# Patient Record
Sex: MALE | Race: WHITE | NOT HISPANIC OR LATINO | URBAN - METROPOLITAN AREA
[De-identification: names, ages, dates, MRNs, and addresses within clinical notes are randomized per-mention and may not be internally consistent; named-entity substitution may affect disease eponyms.]

---

## 2021-01-22 ENCOUNTER — CARE COORDINATION (OUTPATIENT)
Dept: CARDIOLOGY | Facility: CLINIC | Age: 41
End: 2021-01-22

## 2021-01-22 PROBLEM — I25.10 CORONARY ARTERY DISEASE INVOLVING NATIVE HEART WITHOUT ANGINA PECTORIS: Status: ACTIVE | Noted: 2020-09-01

## 2021-01-22 PROBLEM — I50.22 CHRONIC SYSTOLIC CHF (CONGESTIVE HEART FAILURE) (H): Status: ACTIVE | Noted: 2020-09-01

## 2021-01-22 PROBLEM — I25.5 ISCHEMIC CARDIOMYOPATHY: Status: ACTIVE | Noted: 2020-09-01

## 2021-01-22 PROBLEM — I21.3 STEMI (ST ELEVATION MYOCARDIAL INFARCTION) (H): Status: ACTIVE | Noted: 2020-08-14

## 2021-01-22 PROBLEM — E78.2 MIXED HYPERLIPIDEMIA: Status: ACTIVE | Noted: 2020-09-01

## 2021-01-22 RX ORDER — EZETIMIBE 10 MG/1
10 TABLET ORAL DAILY
COMMUNITY
Start: 2020-11-16 | End: 2021-06-01

## 2021-01-22 RX ORDER — METOPROLOL SUCCINATE 100 MG/1
50 TABLET, EXTENDED RELEASE ORAL DAILY
COMMUNITY
Start: 2020-11-10 | End: 2021-06-01

## 2021-01-22 RX ORDER — FUROSEMIDE 20 MG
20 TABLET ORAL PRN
COMMUNITY
Start: 2020-12-09 | End: 2021-06-01

## 2021-01-22 RX ORDER — NITROGLYCERIN 0.4 MG/1
0.4 TABLET SUBLINGUAL EVERY 5 MIN PRN
COMMUNITY

## 2021-01-22 RX ORDER — PAROXETINE 30 MG/1
30 TABLET, FILM COATED ORAL DAILY
COMMUNITY
Start: 2020-10-28 | End: 2021-06-01

## 2021-01-22 RX ORDER — CLONAZEPAM 0.5 MG/1
TABLET ORAL
COMMUNITY
Start: 2020-12-11 | End: 2021-06-01

## 2021-01-22 RX ORDER — SPIRONOLACTONE 25 MG/1
12.5 TABLET ORAL DAILY
COMMUNITY
Start: 2020-08-16 | End: 2021-06-01

## 2021-01-22 RX ORDER — CLOPIDOGREL BISULFATE 75 MG/1
75 TABLET ORAL DAILY
COMMUNITY
Start: 2020-08-17 | End: 2021-06-01

## 2021-01-22 RX ORDER — ATORVASTATIN CALCIUM 80 MG/1
80 TABLET, FILM COATED ORAL AT BEDTIME
COMMUNITY
Start: 2020-10-28 | End: 2021-06-01

## 2021-01-22 NOTE — PROGRESS NOTES
Referral- [facility]    PLAN:       Sending to [facility] Nurse to Triage    Waiting to find out if it will be in-person or virtual.     Imaging: If in person will view in clinic, if doing virtual will have it pushed.       ATTEMPTS TO CONTACT:  1. January 22, 2021 - None  2. January 27, 2021 - Made appt in [place] 2/10 with Dr. Abe Goodman, [facility]  Heart Failure, Advanced Heart Failure & CORE  Referral Specialist &     [facility]  Cardiology  Office: [phone]  [phone]

## 2021-02-10 ENCOUNTER — VIRTUAL VISIT (OUTPATIENT)
Dept: CARDIOLOGY | Facility: CLINIC | Age: 41
End: 2021-02-10
Attending: INTERNAL MEDICINE
Payer: COMMERCIAL

## 2021-02-10 DIAGNOSIS — I50.20 HEART FAILURE WITH REDUCED EJECTION FRACTION, NYHA CLASS III (H): ICD-10-CM

## 2021-02-10 DIAGNOSIS — I25.5 ISCHEMIC CARDIOMYOPATHY: ICD-10-CM

## 2021-02-10 DIAGNOSIS — I10 BENIGN ESSENTIAL HYPERTENSION: Primary | ICD-10-CM

## 2021-02-10 DIAGNOSIS — I25.10 CORONARY ARTERY DISEASE INVOLVING NATIVE CORONARY ARTERY OF NATIVE HEART WITHOUT ANGINA PECTORIS: ICD-10-CM

## 2021-02-10 DIAGNOSIS — E78.2 MIXED HYPERLIPIDEMIA: ICD-10-CM

## 2021-02-10 PROCEDURE — 999N000103 HC STATISTIC NO CHARGE FACILITY FEE: Mod: GT

## 2021-02-10 PROCEDURE — 99204 OFFICE O/P NEW MOD 45 MIN: CPT | Mod: 95 | Performed by: INTERNAL MEDICINE

## 2021-02-10 NOTE — PROGRESS NOTES
Richmond is a 40 year old who is being evaluated via a billable video visit.      How would you like to obtain your AVS? MyChart  If the video visit is dropped, the invitation should be resent by: cell  Will anyone else be joining your video visit? No    Video Start Time: 10:15am    Video-Visit Details    Type of service:  Video Visit    Video End Time:10:57 AM    Originating Location (pt. Location): Home    Distant Location (provider location):  [facility] HEART [facility] [place]     Platform used for Video Visit: [employer]    February 10, 2021     Dear Dr. Douglas,  I had the pleasure of seeing Richmond Morales  in the [facility] Advanced Heart Failure Clinic. Richmond is a 40yr old male with a past medical history of systolic heart failure due to ischemic cardiomyopathy, CAD s/p STEMI with PCI and HLD. Patient was admitted on 8/14/2020 with chest pain and found to have STEMI at outside hospital. Given distance, he was treated with TNK and transferred to [facility] in [place]. Coronary angiogram was urgently performed which revealed 2V CAD s/p JOHN of mLAD with residual  of RCA. Echocardiogram showed LVEF of 20-25% at the time. He was started on HF and CAD medications and returned for planned coronary angiogram on 8/25/2020 when he underwent JOHN of  proximal RCA. Repeat echo showed LVEF remained 20-25%. Was seen 4 weeks after the intervention and had complaints of chest pain so PET scan was ordered. Ultimately went on to have PCI of proximal RPL for his symptoms and repeat TTE was performed that showed a perrsistently reduced EF at 25%.     I am talking to 19 over the video today to establish another [facility] advanced heart failure clinic.  He notes that he unfortunately was not able to make him present to the appointment today as he needs to go to a land auction this afternoon.  Notes that he has been doing relatively well with no new complaints.  He remains with class II symptoms I would say at this time.   He is able to do some form activities however shoveling some grain and doing more extensive physical work remains hard for him and he gets short of breath.  His weight has been quite variable initially while he was going to cardiac rehab it was in the 264 pounds range however recently small in the 274 pound range but pretty steady.  He does have a few pounds up and down and he started to take the Lasix every day rather than just taking it on as-needed basis as he found that he takes it regularly.  He denies any other complaints he does have some PND but no orthopnea no chest pain and no palpitations.  No exertional chest pain either.  Takes her medications as prescribed and denies any other complaints.      Past Medical History:   Diagnosis Date     CHF (congestive heart failure) (HCC)     Coronary artery disease     Hyperlipidemia     Past Surgical History:   Procedure Laterality Date     APPENDECTOMY     CARDIAC CATHETERIZATION     CATHETER CORONARY ARTERY 08/14/2020   Coronary Angiogram - Dr. Ameya Douglas     CATHETER CORONARY ARTERY 10/13/2020   Coronary Angiogram- Dr. NANCY Douglas     CORONARY ANGIOPLASTY     PRQ CARD STENT WANGIO 1 VSL 08/25/2020   Percutaneous Coronary Intervention- Dr. NANCY Douglas     Family History   Problem Relation Age of Onset     Heart Disease Father     Hypertension Mother      SOCIAL HISTORY:  Social History     Socioeconomic History     Marital status: Single     Spouse name: Not on file     Number of children: Not on file     Years of education: Not on file     Highest education level: Not on file   Occupational History     Not on file   Social Needs     Financial resource strain: Not on file     Food insecurity     Worry: Not on file     Inability: Not on file     Transportation needs     Medical: Not on file     Non-medical: Not on file   Tobacco Use     Smoking status: Not on file   Substance and Sexual Activity     Alcohol use: Not on file     Drug use: Not on file     Sexual activity: Not  on file   Lifestyle     Physical activity     Days per week: Not on file     Minutes per session: Not on file     Stress: Not on file   Relationships     Social connections     Talks on phone: Not on file     Gets together: Not on file     Attends Islam service: Not on file     Active member of club or organization: Not on file     Attends meetings of clubs or organizations: Not on file     Relationship status: Not on file     Intimate partner violence     Fear of current or ex partner: Not on file     Emotionally abused: Not on file     Physically abused: Not on file     Forced sexual activity: Not on file   Other Topics Concern     Not on file   Social History Narrative     Not on file     CURRENT MEDICATIONS:  Current Outpatient Medications   Medication     aspirin (ASA) 81 MG EC tablet     atorvastatin (LIPITOR) 80 MG tablet     clonazePAM (KLONOPIN) 0.5 MG tablet     clopidogrel (PLAVIX) 75 MG tablet     ezetimibe (ZETIA) 10 MG tablet     furosemide (LASIX) 20 MG tablet     metoprolol succinate ER (TOPROL-XL) 100 MG 24 hr tablet     nitroGLYcerin (NITROSTAT) 0.4 MG sublingual tablet     PARoxetine (PAXIL) 30 MG tablet     sacubitril-valsartan (ENTRESTO)  MG per tablet     spironolactone (ALDACTONE) 25 MG tablet     No current facility-administered medications for this visit.      ROS:   Constitutional: No fever, chills, or sweats.   ENT: No visual disturbance, ear ache, epistaxis, sore throat.   Allergies/Immunologic: Negative.   Respiratory: No cough, hemoptysis.   Cardiovascular: As per HPI.   GI: No nausea, vomiting, hematemesis, melena, or hematochezia.   : No urinary frequency, dysuria, or hematuria.   Integument: Negative.   Psychiatric: Pleasant, no major depression noted  Neuro: No focal neurological deficits noted  Endocrinology: Negative.   Musculoskeletal: As per HPI.      EXAM:  General: appears comfortable, alert and oriented  Head: normocephalic, atraumatic  Eyes: anicteric sclera,  EOMI , PERRL  Neck: no adenopathy  Orophyarynx: moist mucosa, no lesions noted  Heart: JVP appears normal at 6 cm.  Lungs: Unable to auscultate  Abdomen: Minimal abdominal distention noted  Extremities: No LE edema today  Skin: no open lesions noted  Neuro: grossly non-focal     Labs:  All labs including CBC, CMP, BNP were reviewed in Care Everywhere from the [facility]    TTE 1/18/2021:     Left Ventricle: The left ventricle is moderate-to-severely dilated.     Left Ventricle: Severely reduced left ventricular systolic function. The EF is visually estimated to be 20-25%.     Left Ventricle: Global hypokinesis of the left ventricular wall segements.    6 minute alk test 2020:  Distance:    441 meters    TTE 10/13/2021:  The left ventricle is moderately dilated (6.9cm) Global left ventricular systolic function is severely reduced. Ejection Fraction = 25-30%.  There is anterior septal wall akinesis. There is apical akinesis.  The left atrium is mildly enlarged.     [facility] 10/2020:  DOMINANCE:   Right   LMCA:   Patent - Left Main   LAD:   30 % Lesion Proximal Diffuse - LAD  Mid Stent Patent - LAD   CIRCUMFLEX:   20 % Lesion Diffuse luminal irregularities - Circumflex   RCA:   Proximal Stent Patent - RCA  Mid Stent Patent - RCA  Distal Stent Patent - RCA   RT PDA:   50 % Lesion Ostial - Right PDA   RPL:   90 % Lesion Proximal Large - RPL     Nuclear stress test 9/2020:  Positive stress suggestive of anteroseptal, inferoseptal, inferolateral and apical Monique-infarction ischemia.    TTE 8/2020:  The left ventricle is mildly to moderately dilated. Global left ventricular systolic function is severely reduced. Ejection Fraction = 20-25%. LVIDD: 5.1 cm    Coronary angiogram 8/2020:  Right  LMCA:  20 % Lesion Ostial - Left Main  20 % Lesion Distal - Left Main  LAD:  40 % Lesion Proximal - LAD  99 % Lesion Mid - LAD  CIRCUMFLEX:  Ectatic - Circumflex  Up to 20 % Lesion luminal irregularities - Circumflex  RCA:  100 % Lesion  Mid Chronic total occlusion - RCA    TTE 8/14/2020:  The left ventricle is mildly dilated. Global left ventricular systolic function is severely reduced. Ejection Fraction = 20-25%.  Global hypokinesis is noted. The left atrium is mildly enlarged.  The aortic root is enlarged measuring 40mm in diameter.    ASSESSMENT AND PLAN:  38-year-old gentleman with ischemic cardiomyopathy ejection fraction reduced to 20 to 25% despite optimal medical therapy in the setting of a STEMI in the mid year of 2020.  He did have multiple stenting including mid LAD that was the culprit vessel for STEMI in addition to  of the RCA that was open.  He did have recently stent to the RPL branch which caused improvement in his symptoms overall.  He remains with class III symptoms as above and has stage C heart failure.  Was for his millimeters that his ventricle continues to enlarge despite optimal guideline directed medical therapy.  He started at 5.1 cm and he is reaching 6.5 cm at this time.  He does take all his medications and he is on excellent medical therapy including high-dose Entresto and Toprol-XL spironolactone aspirin Plavix.  I would not change his medical therapy at this time given especially that he did not tolerate higher dose of metoprolol XL in the past.  I do agree that he will need a primary prevention ICD for severely reduced ejection fraction despite the optimal guideline directed medical therapy.  I also agree with him that it is okay in my opinion to continue taking the Lasix 20 mg on a daily basis especially that he gained some fluid weight.  He will discuss with Dr. Douglas whether increasing the dose further to 40 mg daily would be necessary in the future.  At this time we discussed that I will see him back either virtual or in person about 4 months.  It would be good to consider performing a repeat echocardiogram before the visit or at the 6-month timeframe just to see how his ventricle the taps and remodels after  the interventions.  I do not believe he needs advanced therapies at this time however will need close monitoring to ensure the ventricle does not continue to enlarge.    I appreciate the opportunity to participate in the care of Richmond DIAMOND Andrew . Please do not hesitate to contact me with any further questions.    Sincerely,   Wai Fish MD     [facility] Division of Cardiology

## 2021-02-10 NOTE — PATIENT INSTRUCTIONS
You were seen today by [facility] Advanced Heart Failure Cardiologist, Dr. Fish, in partnership with [facility] in [place], [place]       Medication or Plan of Care changes:      No need to change medications today.  You may continue taking Lasix 20 mg daily as you have recently    Follow up:       I will see you back in about 4 months either in person in [place] or virtually depending on your schedule and availability.  We will repeat an echocardiogram either before the visit or about 6 months from now.     If there are any questions about this visit please call us at [phone].      Follow the American Heart Association Diet and Lifestyle recommendations:      Limit saturated fat, trans fat, sodium, red meat, sweets and sugar-sweetened beverages.     If you choose to eat red meat, compare labels and select the leanest cuts available.    Aim for at least 150 minutes of moderate physical activity or 75 minutes of vigorous physical activity - or an equal combination of both - each week.      If you have any other questions or concerns regarding your heart care please contact your [facility] at [phone].

## 2021-02-10 NOTE — LETTER
2/10/2021      RE: Richmond Morales  [address]  [place] [facility] [address]       Dear Colleague,    Thank you for the opportunity to participate in the care of your patient, Richmond Morales, at the [facility] HEART [facility]. Please see a copy of my visit note below.    Richmond is a 40 year old who is being evaluated via a billable video visit.      How would you like to obtain your AVS? MyChart  If the video visit is dropped, the invitation should be resent by: cell  Will anyone else be joining your video visit? No    Video Start Time: 10:15am    Video-Visit Details    Type of service:  Video Visit    Video End Time:10:57 AM    Originating Location (pt. Location): Home    Distant Location (provider location):  [facility]     Platform used for Video Visit: [employer]    February 10, 2021     Dear Dr. Douglas,  I had the pleasure of seeing Richmond Morales  in the [facility] Advanced Heart Failure Clinic. Richmond is a 40yr old male with a past medical history of systolic heart failure due to ischemic cardiomyopathy, CAD s/p STEMI with PCI and HLD. Patient was admitted on 8/14/2020 with chest pain and found to have STEMI at outside hospital. Given distance, he was treated with TNK and transferred to [facility] in [place]. Coronary angiogram was urgently performed which revealed 2V CAD s/p JOHN of mLAD with residual  of RCA. Echocardiogram showed LVEF of 20-25% at the time. He was started on HF and CAD medications and returned for planned coronary angiogram on 8/25/2020 when he underwent JOHN of  proximal RCA. Repeat echo showed LVEF remained 20-25%. Was seen 4 weeks after the intervention and had complaints of chest pain so PET scan was ordered. Ultimately went on to have PCI of proximal RPL for his symptoms and repeat TTE was performed that showed a perrsistently reduced EF at 25%.     I am talking to 19 over the video today to  establish another [facility] advanced heart failure clinic.  He notes that he unfortunately was not able to make him present to the appointment today as he needs to go to a land auction this afternoon.  Notes that he has been doing relatively well with no new complaints.  He remains with class II symptoms I would say at this time.  He is able to do some form activities however shoveling some grain and doing more extensive physical work remains hard for him and he gets short of breath.  His weight has been quite variable initially while he was going to cardiac rehab it was in the 264 pounds range however recently small in the 274 pound range but pretty steady.  He does have a few pounds up and down and he started to take the Lasix every day rather than just taking it on as-needed basis as he found that he takes it regularly.  He denies any other complaints he does have some PND but no orthopnea no chest pain and no palpitations.  No exertional chest pain either.  Takes her medications as prescribed and denies any other complaints.      Past Medical History:   Diagnosis Date     CHF (congestive heart failure) (HCC)     Coronary artery disease     Hyperlipidemia     Past Surgical History:   Procedure Laterality Date     APPENDECTOMY     CARDIAC CATHETERIZATION     CATHETER CORONARY ARTERY 08/14/2020   Coronary Angiogram - Dr. Ameya Douglas     CATHETER CORONARY ARTERY 10/13/2020   Coronary Angiogram- Dr. NANCY Douglas     CORONARY ANGIOPLASTY     PRQ CARD STENT WANGIO 1 VSL 08/25/2020   Percutaneous Coronary Intervention- Dr. NANCY Douglas     Family History   Problem Relation Age of Onset     Heart Disease Father     Hypertension Mother      SOCIAL HISTORY:  Social History     Socioeconomic History     Marital status: Single     Spouse name: Not on file     Number of children: Not on file     Years of education: Not on file     Highest education level: Not on file   Occupational History     Not on file   Social Needs      Financial resource strain: Not on file     Food insecurity     Worry: Not on file     Inability: Not on file     Transportation needs     Medical: Not on file     Non-medical: Not on file   Tobacco Use     Smoking status: Not on file   Substance and Sexual Activity     Alcohol use: Not on file     Drug use: Not on file     Sexual activity: Not on file   Lifestyle     Physical activity     Days per week: Not on file     Minutes per session: Not on file     Stress: Not on file   Relationships     Social connections     Talks on phone: Not on file     Gets together: Not on file     Attends [employer] service: Not on file     Active member of club or organization: Not on file     Attends meetings of clubs or organizations: Not on file     Relationship status: Not on file     Intimate partner violence     Fear of current or ex partner: Not on file     Emotionally abused: Not on file     Physically abused: Not on file     Forced sexual activity: Not on file   Other Topics Concern     Not on file   Social History Narrative     Not on file     CURRENT MEDICATIONS:  Current Outpatient Medications   Medication     aspirin (ASA) 81 MG EC tablet     atorvastatin (LIPITOR) 80 MG tablet     clonazePAM (KLONOPIN) 0.5 MG tablet     clopidogrel (PLAVIX) 75 MG tablet     ezetimibe (ZETIA) 10 MG tablet     furosemide (LASIX) 20 MG tablet     metoprolol succinate ER (TOPROL-XL) 100 MG 24 hr tablet     nitroGLYcerin (NITROSTAT) 0.4 MG sublingual tablet     PARoxetine (PAXIL) 30 MG tablet     sacubitril-valsartan (ENTRESTO)  MG per tablet     spironolactone (ALDACTONE) 25 MG tablet     No current facility-administered medications for this visit.      ROS:   Constitutional: No fever, chills, or sweats.   ENT: No visual disturbance, ear ache, epistaxis, sore throat.   Allergies/Immunologic: Negative.   Respiratory: No cough, hemoptysis.   Cardiovascular: As per HPI.   GI: No nausea, vomiting, hematemesis, melena, or hematochezia.    : No urinary frequency, dysuria, or hematuria.   Integument: Negative.   Psychiatric: Pleasant, no major depression noted  Neuro: No focal neurological deficits noted  Endocrinology: Negative.   Musculoskeletal: As per HPI.      EXAM:  General: appears comfortable, alert and oriented  Head: normocephalic, atraumatic  Eyes: anicteric sclera, EOMI , PERRL  Neck: no adenopathy  Orophyarynx: moist mucosa, no lesions noted  Heart: JVP appears normal at 6 cm.  Lungs: Unable to auscultate  Abdomen: Minimal abdominal distention noted  Extremities: No LE edema today  Skin: no open lesions noted  Neuro: grossly non-focal     Labs:  All labs including CBC, CMP, BNP were reviewed in Care Everywhere from the [facility]    TTE 1/18/2021:     Left Ventricle: The left ventricle is moderate-to-severely dilated.     Left Ventricle: Severely reduced left ventricular systolic function. The EF is visually estimated to be 20-25%.     Left Ventricle: Global hypokinesis of the left ventricular wall segements.    6 minute alk test 2020:  Distance:    441 meters    TTE 10/13/2021:  The left ventricle is moderately dilated (6.9cm) Global left ventricular systolic function is severely reduced. Ejection Fraction = 25-30%.  There is anterior septal wall akinesis. There is apical akinesis.  The left atrium is mildly enlarged.     [facility] 10/2020:  DOMINANCE:   Right   LMCA:   Patent - Left Main   LAD:   30 % Lesion Proximal Diffuse - LAD  Mid Stent Patent - LAD   CIRCUMFLEX:   20 % Lesion Diffuse luminal irregularities - Circumflex   RCA:   Proximal Stent Patent - RCA  Mid Stent Patent - RCA  Distal Stent Patent - RCA   RT PDA:   50 % Lesion Ostial - Right PDA   RPL:   90 % Lesion Proximal Large - RPL     Nuclear stress test 9/2020:  Positive stress suggestive of anteroseptal, inferoseptal, inferolateral and apical Monique-infarction ischemia.    TTE 8/2020:  The left ventricle is mildly to moderately dilated. Global left ventricular systolic  function is severely reduced. Ejection Fraction = 20-25%. LVIDD: 5.1 cm    Coronary angiogram 8/2020:  Right  LMCA:  20 % Lesion Ostial - Left Main  20 % Lesion Distal - Left Main  LAD:  40 % Lesion Proximal - LAD  99 % Lesion Mid - LAD  CIRCUMFLEX:  Ectatic - Circumflex  Up to 20 % Lesion luminal irregularities - Circumflex  RCA:  100 % Lesion Mid Chronic total occlusion - RCA    TTE 8/14/2020:  The left ventricle is mildly dilated. Global left ventricular systolic function is severely reduced. Ejection Fraction = 20-25%.  Global hypokinesis is noted. The left atrium is mildly enlarged.  The aortic root is enlarged measuring 40mm in diameter.    ASSESSMENT AND PLAN:  38-year-old gentleman with ischemic cardiomyopathy ejection fraction reduced to 20 to 25% despite optimal medical therapy in the setting of a STEMI in the mid year of 2020.  He did have multiple stenting including mid LAD that was the culprit vessel for STEMI in addition to  of the RCA that was open.  He did have recently stent to the RPL branch which caused improvement in his symptoms overall.  He remains with class III symptoms as above and has stage C heart failure.  Was for his millimeters that his ventricle continues to enlarge despite optimal guideline directed medical therapy.  He started at 5.1 cm and he is reaching 6.5 cm at this time.  He does take all his medications and he is on excellent medical therapy including high-dose Entresto and Toprol-XL spironolactone aspirin Plavix.  I would not change his medical therapy at this time given especially that he did not tolerate higher dose of metoprolol XL in the past.  I do agree that he will need a primary prevention ICD for severely reduced ejection fraction despite the optimal guideline directed medical therapy.  I also agree with him that it is okay in my opinion to continue taking the Lasix 20 mg on a daily basis especially that he gained some fluid weight.  He will discuss with Dr. Douglas  whether increasing the dose further to 40 mg daily would be necessary in the future.  At this time we discussed that I will see him back either virtual or in person about 4 months.  It would be good to consider performing a repeat echocardiogram before the visit or at the 6-month timeframe just to see how his ventricle the taps and remodels after the interventions.  I do not believe he needs advanced therapies at this time however will need close monitoring to ensure the ventricle does not continue to enlarge.    I appreciate the opportunity to participate in the care of Richmond Morales . Please do not hesitate to contact me with any further questions.    Sincerely,   Wai Fish MD     [facility] Division of Cardiology           Please do not hesitate to contact me if you have any questions/concerns.     Sincerely,     Wai Fish MD

## 2021-02-10 NOTE — NURSING NOTE
Medications reviewed.     Ede Goodman CMA  Heart Failure, Advanced Heart Failure & CORE  Referral Specialist &     [facility]  Cardiology  Office: [phone]

## 2021-06-01 RX ORDER — PAROXETINE 40 MG/1
40 TABLET, FILM COATED ORAL DAILY
COMMUNITY
Start: 2021-02-19

## 2021-06-01 RX ORDER — CLONAZEPAM 0.5 MG/1
TABLET ORAL
COMMUNITY
Start: 2021-04-12

## 2021-06-01 RX ORDER — ATORVASTATIN CALCIUM 80 MG/1
80 TABLET, FILM COATED ORAL AT BEDTIME
COMMUNITY
Start: 2021-05-17 | End: 2022-05-22

## 2021-06-01 RX ORDER — FUROSEMIDE 40 MG
40 TABLET ORAL DAILY
COMMUNITY
Start: 2021-06-01

## 2021-06-01 RX ORDER — METOPROLOL SUCCINATE 50 MG/1
50 TABLET, EXTENDED RELEASE ORAL DAILY
COMMUNITY
Start: 2021-05-03 | End: 2022-05-08

## 2021-06-01 RX ORDER — SPIRONOLACTONE 25 MG/1
25 TABLET ORAL DAILY
COMMUNITY
Start: 2021-05-11

## 2021-06-01 RX ORDER — CLOPIDOGREL BISULFATE 75 MG/1
75 TABLET ORAL DAILY
COMMUNITY
Start: 2021-05-11

## 2021-06-01 RX ORDER — EZETIMIBE 10 MG/1
10 TABLET ORAL DAILY
COMMUNITY
Start: 2021-02-18 | End: 2022-02-23

## 2021-06-01 RX ORDER — ACETAMINOPHEN 325 MG/1
650 TABLET ORAL EVERY 4 HOURS PRN
COMMUNITY
Start: 2021-02-16